# Patient Record
Sex: FEMALE | Race: WHITE | URBAN - METROPOLITAN AREA
[De-identification: names, ages, dates, MRNs, and addresses within clinical notes are randomized per-mention and may not be internally consistent; named-entity substitution may affect disease eponyms.]

---

## 2019-09-27 ENCOUNTER — TELEPHONE (OUTPATIENT)
Dept: FAMILY MEDICINE CLINIC | Age: 34
End: 2019-09-27

## 2019-09-27 ENCOUNTER — OFFICE VISIT (OUTPATIENT)
Dept: FAMILY MEDICINE CLINIC | Age: 34
End: 2019-09-27

## 2019-09-27 VITALS
DIASTOLIC BLOOD PRESSURE: 82 MMHG | SYSTOLIC BLOOD PRESSURE: 114 MMHG | TEMPERATURE: 97.4 F | HEART RATE: 82 BPM | OXYGEN SATURATION: 97 % | HEIGHT: 67 IN | WEIGHT: 147 LBS | BODY MASS INDEX: 23.07 KG/M2 | RESPIRATION RATE: 20 BRPM

## 2019-09-27 DIAGNOSIS — L60.3 BRITTLE NAILS: ICD-10-CM

## 2019-09-27 DIAGNOSIS — Z12.4 SCREENING FOR MALIGNANT NEOPLASM OF CERVIX: ICD-10-CM

## 2019-09-27 DIAGNOSIS — N76.0 BACTERIAL VAGINOSIS: ICD-10-CM

## 2019-09-27 DIAGNOSIS — Z00.00 ANNUAL PHYSICAL EXAM: Primary | ICD-10-CM

## 2019-09-27 DIAGNOSIS — Z11.3 SCREEN FOR STD (SEXUALLY TRANSMITTED DISEASE): ICD-10-CM

## 2019-09-27 DIAGNOSIS — Z23 ENCOUNTER FOR IMMUNIZATION: ICD-10-CM

## 2019-09-27 DIAGNOSIS — B96.89 BACTERIAL VAGINOSIS: ICD-10-CM

## 2019-09-27 RX ORDER — DOXYCYCLINE 100 MG/1
100 CAPSULE ORAL
COMMUNITY
Start: 2012-08-06 | End: 2019-09-27

## 2019-09-27 RX ORDER — OXYCODONE AND ACETAMINOPHEN 5; 325 MG/1; MG/1
1 TABLET ORAL
COMMUNITY
Start: 2012-08-06 | End: 2019-09-27

## 2019-09-27 NOTE — PROGRESS NOTES
Dewaine March     Chief Complaint   Patient presents with    Physical     Vitals:    09/27/19 1309   BP: 114/82   Pulse: 82   Resp: 20   Temp: 97.4 °F (36.3 °C)   TempSrc: Oral   SpO2: 97%   Weight: 147 lb (66.7 kg)   Height: 5' 7\" (1.702 m)   PainSc:   0 - No pain   LMP: 08/05/2019         HPI: Patient is here to establish care and to get annual physical examination as well as Pap smear, she would like also to get  screening for STD. She has been complaining about right fifth toenail discoloration and it is brittle and she is not sure if it is fungal infection. Past Medical History:   Diagnosis Date    Alopecia      Past Surgical History:   Procedure Laterality Date    HX GYN Right 08/23/2012    right fallopian tubal removed (tubal pregnancy)     Social History     Tobacco Use    Smoking status: Light Tobacco Smoker    Smokeless tobacco: Never Used   Substance Use Topics    Alcohol use: Yes     Alcohol/week: 4.0 standard drinks     Types: 4 Glasses of wine per week       History reviewed. No pertinent family history. Review of Systems   Constitutional: Negative for chills, fever, malaise/fatigue and weight loss. HENT: Negative for congestion, ear discharge, ear pain, hearing loss, nosebleeds and sinus pain. Eyes: Negative for blurred vision, double vision and discharge. Respiratory: Negative for cough, hemoptysis, sputum production, shortness of breath and wheezing. Cardiovascular: Negative for chest pain, palpitations, claudication and leg swelling. Gastrointestinal: Negative for abdominal pain, constipation, diarrhea, nausea and vomiting. Genitourinary: Negative for dysuria, frequency and urgency. Musculoskeletal: Negative for back pain, joint pain, myalgias and neck pain. Skin: Negative for itching and rash. Neurological: Negative for dizziness, tingling, sensory change, speech change, focal weakness, seizures, weakness and headaches.    Psychiatric/Behavioral: Negative for depression, hallucinations, substance abuse and suicidal ideas. The patient is not nervous/anxious and does not have insomnia. Physical Exam   Constitutional: She is oriented to person, place, and time. She appears well-developed and well-nourished. No distress. HENT:   Head: Normocephalic and atraumatic. Mouth/Throat: Oropharynx is clear and moist. No oropharyngeal exudate. Eyes: Pupils are equal, round, and reactive to light. Conjunctivae are normal. Right eye exhibits no discharge. Left eye exhibits no discharge. No scleral icterus. Neck: Normal range of motion. Neck supple. No thyromegaly present. Cardiovascular: Normal rate, regular rhythm and normal heart sounds. No murmur heard. Pulmonary/Chest: Effort normal and breath sounds normal. No respiratory distress. She has no wheezes. She has no rales. She exhibits no tenderness. Abdominal: Soft. She exhibits no distension. There is no tenderness. There is no rebound. Musculoskeletal: Normal range of motion. She exhibits no edema, tenderness or deformity. Lymphadenopathy:     She has no cervical adenopathy. Neurological: She is alert and oriented to person, place, and time. No cranial nerve deficit. Coordination normal.   Skin: Skin is warm and dry. No rash noted. She is not diaphoretic. No erythema. No pallor. Right fifth toenail yellowish discoloration and it looks brittle   Psychiatric: She has a normal mood and affect. Her behavior is normal. Judgment and thought content normal.   Nursing note and vitals reviewed. Assessment and plan     Plan of care has been discussed with the patient, he agrees to the plan and verbalized understanding. All his questions were answered  More than 50% of the time spent in this visit was counseling the patient about  illness and treatment options         1. Brittle nails    - KOH, HAIR, SKIN, NAILS; Future  - CULTURE, FUNGUS (MYCOLOGY); Future    2.  Encounter for immunization    - INFLUENZA VIRUS VAC QUAD,SPLIT,PRESV FREE SYRINGE IM  - AL IMMUNIZ ADMIN,1 SINGLE/COMB VAC/TOXOID    3. Annual physical exam    - CBC WITH AUTOMATED DIFF; Future  - LIPID PANEL; Future  - METABOLIC PANEL, COMPREHENSIVE; Future    4. Screen for STD (sexually transmitted disease)    - T PALLIDUM AB; Future  - HEP B SURFACE AG; Future  - HEPATITIS C AB; Future  - HIV 1/2 AG/AB, 4TH GENERATION,W RFLX CONFIRM; Future  - NUSWAB VAGINITIS PLUS; Future    5. Screening for malignant neoplasm of cervix    - PAP IG, APTIMA HPV AND RFX 16/18,45 ([id number]); Future        There are no active problems to display for this patient. No results found for this or any previous visit. No results found for any previous visit. Follow-up and Dispositions    · Return in about 1 year (around 9/27/2020) for as per results.

## 2019-09-27 NOTE — PROGRESS NOTES
Eugenia Wilson is a 29 y.o. female presents in office for:   Chief Complaint   Patient presents with    Physical         Health Maintenance Due   Topic Date Due    Pneumococcal 0-64 years (1 of 1 - PPSV23) 11/18/1992    DTaP/Tdap/Td series (1 - Tdap) 11/18/2007    PAP AKA CERVICAL CYTOLOGY  11/18/2007    Influenza Age 9 to Adult  08/01/2019     Immunization/s administered 9/27/2019 by [facility] with consent. Patient tolerated procedure well. No reactions noted. 1. Have you been to the ER, urgent care clinic since your last visit? Hospitalized since your last visit? No    2. Have you seen or consulted any other health care providers outside of the [address] [place] [address] since your last visit? Include any pap smears or colon screening.  No

## 2019-09-28 NOTE — TELEPHONE ENCOUNTER
Please call patient to come back for nail clipping for fungal testing!!  It was supposed to be done during the visit

## 2019-09-30 NOTE — TELEPHONE ENCOUNTER
Per Caryn Seaman, , [facility] Fungal culture should be done by Thursday.

## 2019-10-01 LAB
A VAGINAE DNA VAG QL NAA+PROBE: ABNORMAL SCORE
ALBUMIN SERPL-MCNC: 4.5 G/DL (ref 3.5–5.5)
ALBUMIN/GLOB SERPL: 1.7 {RATIO} (ref 1.2–2.2)
ALP SERPL-CCNC: 43 IU/L (ref 39–117)
ALT SERPL-CCNC: 15 IU/L (ref 0–32)
AST SERPL-CCNC: 17 IU/L (ref 0–40)
BASOPHILS # BLD AUTO: 0 X10E3/UL (ref 0–0.2)
BASOPHILS NFR BLD AUTO: 1 %
BILIRUB SERPL-MCNC: 0.3 MG/DL (ref 0–1.2)
BUN SERPL-MCNC: 13 MG/DL (ref 6–20)
BUN/CREAT SERPL: 20 (ref 9–23)
BVAB2 DNA VAG QL NAA+PROBE: ABNORMAL SCORE
C ALBICANS DNA VAG QL NAA+PROBE: NEGATIVE
C GLABRATA DNA VAG QL NAA+PROBE: NEGATIVE
C TRACH RRNA SPEC QL NAA+PROBE: NEGATIVE
CALCIUM SERPL-MCNC: 9.2 MG/DL (ref 8.7–10.2)
CHLORIDE SERPL-SCNC: 101 MMOL/L (ref 96–106)
CHOLEST SERPL-MCNC: 212 MG/DL (ref 100–199)
CO2 SERPL-SCNC: 23 MMOL/L (ref 20–29)
CREAT SERPL-MCNC: 0.66 MG/DL (ref 0.57–1)
CYTOLOGIST CVX/VAG CYTO: NORMAL
CYTOLOGY CVX/VAG DOC CYTO: NORMAL
CYTOLOGY CVX/VAG DOC THIN PREP: NORMAL
EOSINOPHIL # BLD AUTO: 0.3 X10E3/UL (ref 0–0.4)
EOSINOPHIL NFR BLD AUTO: 6 %
ERYTHROCYTE [DISTWIDTH] IN BLOOD BY AUTOMATED COUNT: 11.6 % (ref 12.3–15.4)
GLOBULIN SER CALC-MCNC: 2.6 G/DL (ref 1.5–4.5)
GLUCOSE SERPL-MCNC: 96 MG/DL (ref 65–99)
HBV SURFACE AG SERPL QL IA: NEGATIVE
HCT VFR BLD AUTO: 40.1 % (ref 34–46.6)
HCV AB S/CO SERPL IA: <0.1 S/CO RATIO (ref 0–0.9)
HDLC SERPL-MCNC: 65 MG/DL
HGB BLD-MCNC: 13.8 G/DL (ref 11.1–15.9)
HIV 1+2 AB+HIV1 P24 AG SERPL QL IA: NON REACTIVE
HPV I/H RISK 4 DNA CVX QL PROBE+SIG AMP: NEGATIVE
IMM GRANULOCYTES # BLD AUTO: 0 X10E3/UL (ref 0–0.1)
IMM GRANULOCYTES NFR BLD AUTO: 0 %
INTERPRETATION, 910389: NORMAL
LDLC SERPL CALC-MCNC: 94 MG/DL (ref 0–99)
LYMPHOCYTES # BLD AUTO: 1.5 X10E3/UL (ref 0.7–3.1)
LYMPHOCYTES NFR BLD AUTO: 28 %
Lab: NORMAL
MCH RBC QN AUTO: 31.2 PG (ref 26.6–33)
MCHC RBC AUTO-ENTMCNC: 34.4 G/DL (ref 31.5–35.7)
MCV RBC AUTO: 91 FL (ref 79–97)
MEGA1 DNA VAG QL NAA+PROBE: ABNORMAL SCORE
MONOCYTES # BLD AUTO: 0.3 X10E3/UL (ref 0.1–0.9)
MONOCYTES NFR BLD AUTO: 6 %
N GONORRHOEA RRNA SPEC QL NAA+PROBE: NEGATIVE
NEUTROPHILS # BLD AUTO: 3.3 X10E3/UL (ref 1.4–7)
NEUTROPHILS NFR BLD AUTO: 59 %
OTHER STN SPEC: NORMAL
PLATELET # BLD AUTO: 229 X10E3/UL (ref 150–450)
POTASSIUM SERPL-SCNC: 4.2 MMOL/L (ref 3.5–5.2)
PROT SERPL-MCNC: 7.1 G/DL (ref 6–8.5)
RBC # BLD AUTO: 4.42 X10E6/UL (ref 3.77–5.28)
SODIUM SERPL-SCNC: 140 MMOL/L (ref 134–144)
STAT OF ADQ CVX/VAG CYTO-IMP: NORMAL
T PALLIDUM AB SER QL IA: NEGATIVE
T VAGINALIS RRNA SPEC QL NAA+PROBE: NEGATIVE
TRIGL SERPL-MCNC: 264 MG/DL (ref 0–149)
VLDLC SERPL CALC-MCNC: 53 MG/DL (ref 5–40)
WBC # BLD AUTO: 5.4 X10E3/UL (ref 3.4–10.8)

## 2019-10-04 ENCOUNTER — TELEPHONE (OUTPATIENT)
Dept: FAMILY MEDICINE CLINIC | Age: 34
End: 2019-10-04

## 2019-10-04 RX ORDER — METRONIDAZOLE 500 MG/1
500 TABLET ORAL 3 TIMES DAILY
Qty: 30 TAB | Refills: 0 | Status: SHIPPED | OUTPATIENT
Start: 2019-10-04 | End: 2019-10-14

## 2019-10-04 NOTE — TELEPHONE ENCOUNTER
Patient contacted with results per PCP, verified 2 patient identifiers. She has no further questions or concerns at this time. She will come by the office to  Flagyl rx. Rx placed in box at .

## 2019-10-04 NOTE — TELEPHONE ENCOUNTER
----- Message from Boone Fisher MD sent at 10/4/2019  1:32 PM EDT -----  Pap smear is normal negative HPV repeat in 3 years      CBC is normal, liver and kidney function are normal    STD screen is negative    Bacterial vaginosis Flagyl will be called to be taken for 10 days patient to avoid alcohol while taking the antibiotic      Cholesterol looks slightly elevated but LDL and HDL are normal, triglyceride is elevated but this may be due to the patient taking the sample nonfasting.

## 2019-10-04 NOTE — PROGRESS NOTES
Pap smear is normal negative HPV repeat in 3 years      CBC is normal, liver and kidney function are normal    STD screen is negative    Bacterial vaginosis Flagyl will be called to be taken for 10 days patient to avoid alcohol while taking the antibiotic      Cholesterol looks slightly elevated but LDL and HDL are normal, triglyceride is elevated but this may be due to the patient taking the sample nonfasting.

## 2019-10-30 LAB
FUNGUS SPEC CULT: NORMAL
FUNGUS SPEC FUNGUS STN: NORMAL

## 2019-10-31 ENCOUNTER — TELEPHONE (OUTPATIENT)
Dept: FAMILY MEDICINE CLINIC | Age: 34
End: 2019-10-31

## 2019-10-31 NOTE — TELEPHONE ENCOUNTER
----- Message from Nathanael West MD sent at 10/30/2019 10:13 PM EDT -----  This is negative for fungus

## 2019-10-31 NOTE — TELEPHONE ENCOUNTER
Spoke to patient about results and verified using 2 patient identifiers. Patient acknowledge results given under advisement of PCP. Patient repeats any additional instruction. Patient has no other concerns at this time. This is negative for fungus   Pap smear is normal negative HPV repeat in 3 years  CBC is normal, liver and kidney function are normal  STD screen is negative  Bacterial vaginosis Flagyl will be called to be taken for 10 days patient to avoid alcohol while taking the antibiotic. Cholesterol looks slightly elevated but LDL and HDL are normal, triglyceride is elevated but this may be due to the patient taking the sample nonfasting.

## 2025-02-18 ENCOUNTER — TELEPHONE (OUTPATIENT)
Age: 40
End: 2025-02-18

## 2025-02-18 NOTE — TELEPHONE ENCOUNTER
Patient was seen @ [facility] 2/18 today for a injury that happened 2/16 she was diagnosed with a fracture of the lower end of fibula   Patient stated that [facility] was sending over the X-Rays and report  Patient has been scheduled with Dr. Soliz for 2/25  Patient stated that she is in  splint   Patient can be contacted @ [phone]

## 2025-02-24 SDOH — HEALTH STABILITY: PHYSICAL HEALTH: ON AVERAGE, HOW MANY DAYS PER WEEK DO YOU ENGAGE IN MODERATE TO STRENUOUS EXERCISE (LIKE A BRISK WALK)?: 7 DAYS

## 2025-02-24 SDOH — HEALTH STABILITY: PHYSICAL HEALTH: ON AVERAGE, HOW MANY MINUTES DO YOU ENGAGE IN EXERCISE AT THIS LEVEL?: 30 MIN

## 2025-02-25 ENCOUNTER — OFFICE VISIT (OUTPATIENT)
Age: 40
End: 2025-02-25

## 2025-02-25 DIAGNOSIS — S82.892A CLOSED FRACTURE OF LEFT ANKLE, INITIAL ENCOUNTER: Primary | ICD-10-CM

## 2025-02-25 DIAGNOSIS — M25.572 ACUTE LEFT ANKLE PAIN: ICD-10-CM

## 2025-02-25 RX ORDER — ACETAMINOPHEN 500 MG
500 TABLET ORAL 2 TIMES DAILY PRN
Qty: 60 TABLET | Refills: 0 | Status: SHIPPED | OUTPATIENT
Start: 2025-02-25

## 2025-02-25 NOTE — PROGRESS NOTES
AMBULATORY PROGRESS NOTE      Patient: Alicia Shaw             MRN: [id number]     SSN: [id number] There is no height or weight on file to calculate BMI.  YOB: 1985     AGE: 39 y.o.       EX: female    PCP: Kathryn Pedro MD       IMPRESSION //  DIAGNOSIS AND TREATMENT PLAN      Alicia Shaw has a diagnosis of:      Alicia Shaw understands that she sustained a left ankle fracture.  She needed watch closely to make sure that there is swelling improved, and that her alignment is maintained.  There is a chance she might require surgery, should alignment change she understands this.    DIAGNOSES    1. Closed fracture of left ankle, initial encounter    2. Acute left ankle pain          PLAN:    1. Placed into left posterior sugartong splint  2. Continue nonweightbearing to left lower extremity, use knee scooter to ambulate.   3. Take Aspirin 325 mg once daily. Recommended OTC Ibuprofen as needed for pain.   4. Anticipate x-rays of the left foot and ankle next visit     RTO 1 week     Orders Placed This Encounter    CLOSED TX DIST FIBULA FX    [22615] Ankle Min 3V     Order Specific Question:   Are you Pregnant?     Answer:   No    acetaminophen (TYLENOL) 500 MG tablet     Sig: Take 1 tablet by mouth 2 times daily as needed for Pain     Dispense:  60 tablet     Refill:  0        Patient Instructions   Continue nonweightbearing to left lower extremity, use knee scooter to ambulate.   Keep leg elevated above hip level to decrease swelling.  Take Aspirin 325 mg once daily. Recommended OTC Ibuprofen as needed for pain.  Follow up in 1 week      Please follow up with your PCP for any health maintenance as recommended.         Alicia Shaw  expresses understanding of the diagnosis, treatment plan, and all of their proposed questions were answered to their satisfaction. Patient education has been provided re the diagnoses.         HPI //  OBJECTIVE EXAMINATION      Alicia Shaw IS

## 2025-02-25 NOTE — PATIENT INSTRUCTIONS
Continue nonweightbearing to left lower extremity, use knee scooter to ambulate.   Keep leg elevated above hip level to decrease swelling.  Take Aspirin 325 mg once daily. Recommended OTC Ibuprofen as needed for pain.  Follow up in 1 week

## 2025-03-03 ENCOUNTER — OFFICE VISIT (OUTPATIENT)
Age: 40
End: 2025-03-03

## 2025-03-03 DIAGNOSIS — S82.892A CLOSED FRACTURE OF LEFT ANKLE, INITIAL ENCOUNTER: Primary | ICD-10-CM

## 2025-03-03 NOTE — PROGRESS NOTES
AMBULATORY PROGRESS NOTE      Patient: Alicia Shaw             MRN: [id number]     SSN: [id number] There is no height or weight on file to calculate BMI.  YOB: 1985     AGE: 39 y.o.       EX: female    PCP: Kathryn Pedro MD       IMPRESSION //  DIAGNOSIS AND TREATMENT PLAN      Alicia Shaw has a diagnosis of:      I did review, x-rays.  X-rays in the splint x-rays in a splint, today, show acceptable alignment of the ankle, these are 3 views of the ankle, AP lateral bleak.  There is a minimally displaced fibular fracture by about 1 mm there is no lateralization of it no proximal migration of it no rotational deformities.  The medial clear space and mortise appear intact.    She is placed in a well-padded well supported short leg fiberglass cast.    X-rays 3 views left ankle,  3 views, AP lateral oblique images: in the cast, show acceptable alignment.  No change in alignment.    DIAGNOSES    1. Closed fracture of left ankle, initial encounter          PLAN:    1. Placed into left fiberglass cast  2. Continue Aspirin 325 mg 1 po BID   3. Recommended OTC Tylenol arthritis strength for pain     RTO 2 weeks     Orders Placed This Encounter    AMB POC XRAY, ANKLE; 2 VIEWS     Order Specific Question:   Are you Pregnant?     Answer:   No    AMB POC XRAY, FOOT; COMPLETE, 3+ VIEW     Order Specific Question:   Are you Pregnant?     Answer:   No    [[id number]] Ankle Min 3V     Order Specific Question:   Are you Pregnant?     Answer:   No        There are no Patient Instructions on file for this visit.      Please follow up with your PCP for any health maintenance as recommended.         Alicia Shaw  expresses understanding of the diagnosis, treatment plan, and all of their proposed questions were answered to their satisfaction. Patient education has been provided re the diagnoses.         HPI //  OBJECTIVE EXAMINATION      Alicia Shaw IS A 39 y.o. female who is a/an  established

## 2025-03-18 ENCOUNTER — OFFICE VISIT (OUTPATIENT)
Age: 40
End: 2025-03-18

## 2025-03-18 ENCOUNTER — HOSPITAL ENCOUNTER (OUTPATIENT)
Facility: HOSPITAL | Age: 40
Discharge: HOME | End: 2025-03-20
Attending: ORTHOPAEDIC SURGERY
Payer: MEDICAID

## 2025-03-18 ENCOUNTER — TELEPHONE (OUTPATIENT)
Age: 40
End: 2025-03-18

## 2025-03-18 DIAGNOSIS — S82.892A CLOSED FRACTURE OF LEFT ANKLE, INITIAL ENCOUNTER: Primary | ICD-10-CM

## 2025-03-18 DIAGNOSIS — I82.453 ACUTE DEEP VEIN THROMBOSIS (DVT) OF BOTH PERONEAL VEINS (HCC): ICD-10-CM

## 2025-03-18 DIAGNOSIS — S82.892A CLOSED FRACTURE OF LEFT ANKLE, INITIAL ENCOUNTER: ICD-10-CM

## 2025-03-18 PROCEDURE — 93971 EXTREMITY STUDY: CPT

## 2025-03-18 NOTE — PROGRESS NOTES
AMB POC XRAY, ANKLE; COMPLETE, 3+ VIEW     Are you Pregnant?:   No    External Referral To Vascular Surgery     Referral Priority:   Urgent     Referral Type:   Eval and Treat     Referral Reason:   Specialty Services Required     Referred to Provider:   Eliceo Scherer MD     Requested Specialty:   Vascular Surgery     Number of Visits Requested:   1    apixaban (ELIQUIS) 5 MG TABS tablet     Sig: Take 1 tablet by mouth 2 times daily TAKE 2, 5 MG ELIQUIS TABLETS TWICE A DAY FOR 7 DAYS,  THEN TAKE ONE  5 MG TABLET TWICE A DAY (AFTER DAY SEVEN)     Dispense:  60 tablet     Refill:  0    Vascular duplex lower extremity venous left     Standing Status:   Future     Number of Occurrences:   1     Expected Date:   3/18/2025     Expiration Date:   3/18/2026        There are no Patient Instructions on file for this visit.      Please follow up with your PCP for any health maintenance as recommended.         Alicia Shaw  expresses understanding of the diagnosis, treatment plan, and all of their proposed questions were answered to their satisfaction. Patient education has been provided re the diagnoses.         HPI //  OBJECTIVE EXAMINATION      Alicia Shaw IS A 39 y.o. female who is a/an  established patient, presenting to my outpatient office for evaluation of  the following chief complaint(s):     Chief Complaint   Patient presents with    Ankle Pain     Left ankle         Patient presents for a left ankle fracture follow up. Her ankle is doing well thus far. She complains of Charley horse spasms to the calf that can sometimes be intense. She has spasm episodes every other day, mostly occurring in the mornings. She had a lot of tightness to the calf this morning. She is still taking the Aspirin 325 mg. She noticed the toes on the left foot appear darker than the right. Denies any tanning or prolonged sun exposure. Additionally, she had a fall from her knee scooter last Sunday and now has a bruise on the left

## 2025-03-18 NOTE — TELEPHONE ENCOUNTER
Rosario from [facility] pharmacy states directions are needed for apixaban (ELIQUIS) 5 MG TABS tablet         [facility]/pharmacy #[id number] - [place], [place] - [facility] [address] - P [phone] - F [phone]

## 2025-04-02 ENCOUNTER — OFFICE VISIT (OUTPATIENT)
Age: 40
End: 2025-04-02

## 2025-04-02 DIAGNOSIS — S82.892A CLOSED FRACTURE OF LEFT ANKLE, INITIAL ENCOUNTER: Primary | ICD-10-CM

## 2025-04-02 DIAGNOSIS — Z98.890 POST-OPERATIVE STATE: ICD-10-CM

## 2025-04-02 NOTE — PROGRESS NOTES
calf muscles.    RADIOGRAPHS// IMAGING//DIAGNOSTIC DATA     Orders Placed This Encounter   Procedures    [13309] Ankle Min 3V     left     Are you Pregnant?:   No    DME Order for (Specify) as OP     - DME device ordered - Tall Cam-Walker      LEFT ANKLE X-rays,NON WEIGHT BEARING  3 views, AP/LAT/OBL completed 4/2/2025  AT [facility]     X-rays reveal Osseous:   Fractures are still present, to the fibula.  The overall alignment, is anatomically aligned.  I do not see much bony formation, yet, on her x-rays, on these x-rays at this current time.  Healing fractures  there are no dislocations. No focal osteolytic or osteoblastic process. Bone Spurs: No significant bone spurs. Overall alignment is acceptable. Ankle mortise alignment is congruent, Tibial plafond and talar dome intact. Soft tissue swelling is mild swelling seen laterally and medially noted, No radiopaque foreign body and No abnormal calcific densities to soft tissues. No osteolytic or osteoblastic lesions noted, no projection x-ray images. Mineralization suggests no osteopenia. Degenerative changes/Joint Condition: No Significant OA is not noted. No Osteochondral defects see. Calcified vessels are not present.     I have personally reviewed the results of the above study and the interpretation of this study is my professional opinion.              CHART REVIEW     Alicia Shaw has been experiencing pain and discomfort confirmed as outlined in the pain assessment outlined below.     was reviewed by Fco Soliz MD on 4/2/2025.     PAST MEDICAL HISTORY:   Past Medical History:   Diagnosis Date    Alopecia      PAST SURGICAL HISTORY:   Past Surgical History:   Procedure Laterality Date    GYN Right 08/23/2012    right fallopian tubal removed (tubal pregnancy)     ALLERGIES: No Known Allergies   FAMILY HISTORY: History reviewed. No pertinent family history.  SOCIAL HISTORY:   Social History     Socioeconomic History    Marital status:

## 2025-04-16 ENCOUNTER — OFFICE VISIT (OUTPATIENT)
Age: 40
End: 2025-04-16
Payer: MEDICAID

## 2025-04-16 DIAGNOSIS — S82.892A CLOSED FRACTURE OF LEFT ANKLE, INITIAL ENCOUNTER: Primary | ICD-10-CM

## 2025-04-16 PROCEDURE — 73610 X-RAY EXAM OF ANKLE: CPT | Performed by: ORTHOPAEDIC SURGERY

## 2025-04-16 PROCEDURE — 99024 POSTOP FOLLOW-UP VISIT: CPT | Performed by: ORTHOPAEDIC SURGERY

## 2025-04-16 NOTE — PROGRESS NOTES
this health [facility].         Documentation by aracelis Turner, as dictated by Fco Soliz MD on 4/16/2025.

## 2025-05-03 NOTE — PROGRESS NOTES
AMBULATORY PROGRESS NOTE      Patient: Alicia Shaw             MRN: [id number]     SSN: [id number] There is no height or weight on file to calculate BMI.  YOB: 1985     AGE: 39 y.o.       EX: female    PCP: Kathryn Pedro MD       IMPRESSION //  DIAGNOSIS AND TREATMENT PLAN      Alicia Shaw has a diagnosis of:      DIAGNOSES    1. Closed fracture of left ankle, initial encounter    2. Post-operative state    3. Acute left ankle pain          PLAN:    1. Walk as tolerated, not recommended to run.   2. Placed into ankle brace: She is protected in the air sport Aircast brace, she can weight-bear as tolerated, no twisting type sports.    RTO 5 weeks, with x-rays 3 views, of the left ankle, upon next visit, nonweightbearing.    Orders Placed This Encounter    [[id number]] Ankle Min 3V     Are you Pregnant?:   No    AirSport Ankle Brace        There are no Patient Instructions on file for this visit.      Please follow up with your PCP for any health maintenance as recommended.         Alicia Shaw  expresses understanding of the diagnosis, treatment plan, and all of their proposed questions were answered to their satisfaction. Patient education has been provided re the diagnoses.         HPI //  OBJECTIVE EXAMINATION      Alicia Shaw IS A 39 y.o. female who is a/an  established patient, presenting to my outpatient office for evaluation of  the following chief complaint(s):     Chief Complaint   Patient presents with    Foot Pain     Left6 foot     Patient presents for a left ankle fracture follow up, injury occurred 2/16/2025 after fall down escalator. Patient is currently taking Eliquis. Patient denies pain.     Patient is employed at: [employer]       There were no vitals taken for this visit.    Appearance: Alert, well appearing and pleasant patient who is in no distress, oriented to person, place/time, and who follows commands. Normal dress/motor activity/thought

## 2025-05-07 ENCOUNTER — OFFICE VISIT (OUTPATIENT)
Age: 40
End: 2025-05-07
Payer: MEDICAID

## 2025-05-07 DIAGNOSIS — S82.892A CLOSED FRACTURE OF LEFT ANKLE, INITIAL ENCOUNTER: Primary | ICD-10-CM

## 2025-05-07 DIAGNOSIS — M25.572 ACUTE LEFT ANKLE PAIN: ICD-10-CM

## 2025-05-07 DIAGNOSIS — Z98.890 POST-OPERATIVE STATE: ICD-10-CM

## 2025-05-07 PROCEDURE — 99024 POSTOP FOLLOW-UP VISIT: CPT | Performed by: ORTHOPAEDIC SURGERY

## 2025-05-07 PROCEDURE — 73610 X-RAY EXAM OF ANKLE: CPT | Performed by: ORTHOPAEDIC SURGERY

## 2025-06-11 ENCOUNTER — OFFICE VISIT (OUTPATIENT)
Age: 40
End: 2025-06-11
Payer: MEDICAID

## 2025-06-11 DIAGNOSIS — Z98.890 POST-OPERATIVE STATE: ICD-10-CM

## 2025-06-11 DIAGNOSIS — S82.892A CLOSED FRACTURE OF LEFT ANKLE, INITIAL ENCOUNTER: Primary | ICD-10-CM

## 2025-06-11 PROCEDURE — 99213 OFFICE O/P EST LOW 20 MIN: CPT | Performed by: ORTHOPAEDIC SURGERY

## 2025-06-11 NOTE — PROGRESS NOTES
AMBULATORY PROGRESS NOTE      Patient: Alicia Shaw             MRN: [id number]     SSN: [id number] There is no height or weight on file to calculate BMI.  YOB: 1985     AGE: 39 y.o.       EX: female    PCP: Kathryn Pedro MD       IMPRESSION //  DIAGNOSIS AND TREATMENT PLAN      Alicia Shaw has a diagnosis of:      DIAGNOSES    1. Closed fracture of left ankle, initial encounter    2. Post-operative state          PLAN:    1. Continue activities as tolerated    RTO PRN    No orders of the defined types were placed in this encounter.       There are no Patient Instructions on file for this visit.      Please follow up with your PCP for any health maintenance as recommended.         Alicia Shaw  expresses understanding of the diagnosis, treatment plan, and all of their proposed questions were answered to their satisfaction. Patient education has been provided re the diagnoses.         HPI //  OBJECTIVE EXAMINATION      Alicia Shaw IS A 39 y.o. female who is a/an  established patient, presenting to my outpatient office for evaluation of  the following chief complaint(s):     Chief Complaint   Patient presents with    Ankle Pain     left       Patient presents for a left ankle fracture follow up, injury occurred 2/16/2025 after fall down escalator. She is doing well, denies ankle pain. She transitioned from the Airsport ankle brace into an OTC neoprene ankle sleeve. She walked about 5 miles at [employer] and 3 miles at [facility] without pain. She goes to the gym and performs leg presses and squats without pain. She has not started jogging for exercise yet. 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 Denies any swelling to the ankle. She only complains of an ingrown great toenail plate, was able to remove on her own.     Patient is employed at: [employer]     There were no vitals taken for this visit.    Appearance: Alert, well appearing and pleasant patient